# Patient Record
Sex: FEMALE | Race: WHITE | NOT HISPANIC OR LATINO | Employment: UNEMPLOYED | URBAN - METROPOLITAN AREA
[De-identification: names, ages, dates, MRNs, and addresses within clinical notes are randomized per-mention and may not be internally consistent; named-entity substitution may affect disease eponyms.]

---

## 2021-03-31 LAB — POC BETA-HCG (QUAL): NEGATIVE

## 2022-04-10 ENCOUNTER — HISTORICAL (OUTPATIENT)
Dept: ADMINISTRATIVE | Facility: HOSPITAL | Age: 27
End: 2022-04-10

## 2022-04-26 VITALS
HEIGHT: 63 IN | DIASTOLIC BLOOD PRESSURE: 87 MMHG | WEIGHT: 179.81 LBS | SYSTOLIC BLOOD PRESSURE: 129 MMHG | BODY MASS INDEX: 31.86 KG/M2

## 2022-09-21 ENCOUNTER — HISTORICAL (OUTPATIENT)
Dept: ADMINISTRATIVE | Facility: HOSPITAL | Age: 27
End: 2022-09-21

## 2023-01-26 LAB
CHLAMYDIA: POSITIVE
PAP SMEAR: NORMAL

## 2023-01-26 RX ORDER — BUPROPION HYDROCHLORIDE 300 MG/1
300 TABLET ORAL
COMMUNITY
Start: 2022-04-29

## 2023-01-26 RX ORDER — DEXTROAMPHETAMINE SACCHARATE, AMPHETAMINE ASPARTATE, DEXTROAMPHETAMINE SULFATE AND AMPHETAMINE SULFATE 5; 5; 5; 5 MG/1; MG/1; MG/1; MG/1
1 TABLET ORAL DAILY
COMMUNITY

## 2023-01-31 ENCOUNTER — OFFICE VISIT (OUTPATIENT)
Dept: OBSTETRICS AND GYNECOLOGY | Facility: CLINIC | Age: 28
End: 2023-01-31
Payer: COMMERCIAL

## 2023-01-31 VITALS
SYSTOLIC BLOOD PRESSURE: 116 MMHG | DIASTOLIC BLOOD PRESSURE: 70 MMHG | WEIGHT: 179.69 LBS | HEIGHT: 63 IN | OXYGEN SATURATION: 98 % | RESPIRATION RATE: 18 BRPM | HEART RATE: 84 BPM | BODY MASS INDEX: 31.84 KG/M2

## 2023-01-31 DIAGNOSIS — A74.9 CHLAMYDIA: Primary | ICD-10-CM

## 2023-01-31 PROCEDURE — 3078F PR MOST RECENT DIASTOLIC BLOOD PRESSURE < 80 MM HG: ICD-10-PCS | Mod: CPTII,,, | Performed by: NURSE PRACTITIONER

## 2023-01-31 PROCEDURE — 1159F MED LIST DOCD IN RCRD: CPT | Mod: CPTII,,, | Performed by: NURSE PRACTITIONER

## 2023-01-31 PROCEDURE — 1160F RVW MEDS BY RX/DR IN RCRD: CPT | Mod: CPTII,,, | Performed by: NURSE PRACTITIONER

## 2023-01-31 PROCEDURE — 3008F PR BODY MASS INDEX (BMI) DOCUMENTED: ICD-10-PCS | Mod: CPTII,,, | Performed by: NURSE PRACTITIONER

## 2023-01-31 PROCEDURE — 1160F PR REVIEW ALL MEDS BY PRESCRIBER/CLIN PHARMACIST DOCUMENTED: ICD-10-PCS | Mod: CPTII,,, | Performed by: NURSE PRACTITIONER

## 2023-01-31 PROCEDURE — 99213 OFFICE O/P EST LOW 20 MIN: CPT | Mod: ,,, | Performed by: NURSE PRACTITIONER

## 2023-01-31 PROCEDURE — 1159F PR MEDICATION LIST DOCUMENTED IN MEDICAL RECORD: ICD-10-PCS | Mod: CPTII,,, | Performed by: NURSE PRACTITIONER

## 2023-01-31 PROCEDURE — 99213 PR OFFICE/OUTPT VISIT, EST, LEVL III, 20-29 MIN: ICD-10-PCS | Mod: ,,, | Performed by: NURSE PRACTITIONER

## 2023-01-31 PROCEDURE — 3074F PR MOST RECENT SYSTOLIC BLOOD PRESSURE < 130 MM HG: ICD-10-PCS | Mod: CPTII,,, | Performed by: NURSE PRACTITIONER

## 2023-01-31 PROCEDURE — 3078F DIAST BP <80 MM HG: CPT | Mod: CPTII,,, | Performed by: NURSE PRACTITIONER

## 2023-01-31 PROCEDURE — 3074F SYST BP LT 130 MM HG: CPT | Mod: CPTII,,, | Performed by: NURSE PRACTITIONER

## 2023-01-31 PROCEDURE — 3008F BODY MASS INDEX DOCD: CPT | Mod: CPTII,,, | Performed by: NURSE PRACTITIONER

## 2023-01-31 NOTE — PROGRESS NOTES
"  Chief Complaint: STD Test of Cure     HPI:      Ashley Etienne is a 27 y.o.  who presents with previous diagnosis of Chlamydia for a test of cure.  She took her meds approximately 2 weeks ago.  She denies itching, denies odor, denies discharge.  She denies sores, blisters, or ulcers. Reports that partner did also take meds for the infection.  Patient's last menstrual period was 2023..    History reviewed. No pertinent past medical history.    ROS:     GENERAL: Denies weight gain or weight loss. Feeling well overall.   SKIN: Denies rash or lesions.   ABDOMEN: Denies abdominal pain, constipation, diarrhea, nausea, vomiting, change in appetite or rectal bleeding.   URINARY: Denies frequency, dysuria, hematuria.  GYN: See HPI.    Physical Exam:      PHYSICAL EXAM:   Vitals:    23 0749   BP: 116/70   Pulse: 84   Resp: 18   SpO2: 98%   Weight: 81.5 kg (179 lb 10.8 oz)   Height: 5' 3" (1.6 m)     Body mass index is 31.83 kg/m².    General: No acute distress, alert and engaged  Pelvic: External genitalia and urethra within normal limits.    Vagina without lesions, with discharge, without ulcers.   Cervix without cervical motion tenderness   Uterus normal in size and nontender. No adnexal masses or tenderness palpated.    Assessment/Plan:     Chlamydia  -     MDL Sendout Test      KB for STD, will notify patient of vaginal swab and blood work results as they are resulted  Follow up: PRN if no improvement.      Counseling:     Counseled on safe sex practices including barrier methods such as condoms to protect against STIs.     "

## 2023-02-03 ENCOUNTER — DOCUMENTATION ONLY (OUTPATIENT)
Dept: OBSTETRICS AND GYNECOLOGY | Facility: CLINIC | Age: 28
End: 2023-02-03
Payer: COMMERCIAL

## 2023-02-03 LAB — CHLAMYDIA: NEGATIVE

## 2023-03-01 ENCOUNTER — OFFICE VISIT (OUTPATIENT)
Dept: OBSTETRICS AND GYNECOLOGY | Facility: CLINIC | Age: 28
End: 2023-03-01
Payer: COMMERCIAL

## 2023-03-01 VITALS
DIASTOLIC BLOOD PRESSURE: 72 MMHG | HEIGHT: 63 IN | HEART RATE: 92 BPM | SYSTOLIC BLOOD PRESSURE: 110 MMHG | WEIGHT: 183 LBS | BODY MASS INDEX: 32.43 KG/M2

## 2023-03-01 DIAGNOSIS — N76.0 ACUTE VAGINITIS: ICD-10-CM

## 2023-03-01 DIAGNOSIS — N89.8 VAGINAL IRRITATION: Primary | ICD-10-CM

## 2023-03-01 PROCEDURE — 3008F BODY MASS INDEX DOCD: CPT | Mod: CPTII,,, | Performed by: NURSE PRACTITIONER

## 2023-03-01 PROCEDURE — 3008F PR BODY MASS INDEX (BMI) DOCUMENTED: ICD-10-PCS | Mod: CPTII,,, | Performed by: NURSE PRACTITIONER

## 2023-03-01 PROCEDURE — 99213 PR OFFICE/OUTPT VISIT, EST, LEVL III, 20-29 MIN: ICD-10-PCS | Mod: ,,, | Performed by: NURSE PRACTITIONER

## 2023-03-01 PROCEDURE — 3078F PR MOST RECENT DIASTOLIC BLOOD PRESSURE < 80 MM HG: ICD-10-PCS | Mod: CPTII,,, | Performed by: NURSE PRACTITIONER

## 2023-03-01 PROCEDURE — 1159F PR MEDICATION LIST DOCUMENTED IN MEDICAL RECORD: ICD-10-PCS | Mod: CPTII,,, | Performed by: NURSE PRACTITIONER

## 2023-03-01 PROCEDURE — 1159F MED LIST DOCD IN RCRD: CPT | Mod: CPTII,,, | Performed by: NURSE PRACTITIONER

## 2023-03-01 PROCEDURE — 1160F RVW MEDS BY RX/DR IN RCRD: CPT | Mod: CPTII,,, | Performed by: NURSE PRACTITIONER

## 2023-03-01 PROCEDURE — 1160F PR REVIEW ALL MEDS BY PRESCRIBER/CLIN PHARMACIST DOCUMENTED: ICD-10-PCS | Mod: CPTII,,, | Performed by: NURSE PRACTITIONER

## 2023-03-01 PROCEDURE — 3074F PR MOST RECENT SYSTOLIC BLOOD PRESSURE < 130 MM HG: ICD-10-PCS | Mod: CPTII,,, | Performed by: NURSE PRACTITIONER

## 2023-03-01 PROCEDURE — 99213 OFFICE O/P EST LOW 20 MIN: CPT | Mod: ,,, | Performed by: NURSE PRACTITIONER

## 2023-03-01 PROCEDURE — 3078F DIAST BP <80 MM HG: CPT | Mod: CPTII,,, | Performed by: NURSE PRACTITIONER

## 2023-03-01 PROCEDURE — 3074F SYST BP LT 130 MM HG: CPT | Mod: CPTII,,, | Performed by: NURSE PRACTITIONER

## 2023-03-01 RX ORDER — CLOTRIMAZOLE AND BETAMETHASONE DIPROPIONATE 10; .64 MG/G; MG/G
CREAM TOPICAL 2 TIMES DAILY
Qty: 45 G | Refills: 1 | Status: SHIPPED | OUTPATIENT
Start: 2023-03-01 | End: 2023-03-15

## 2023-03-01 RX ORDER — FLUCONAZOLE 150 MG/1
150 TABLET ORAL EVERY OTHER DAY
Qty: 2 TABLET | Refills: 0 | Status: SHIPPED | OUTPATIENT
Start: 2023-03-01 | End: 2023-03-04

## 2023-03-01 NOTE — PROGRESS NOTES
" Patient ID: [id number]     Chief Complaint:  VAG SWELLING,IRRITATION ,AND ITCHING.  HPI:     Ashley Etienne is a 27 y.o. female here today for PROBLEM (PRESENTS TO CLINIC FOR C/O VAG SWELLING,IRRITATION ,AND ITCHING. STATES WAS TX LAST WEEK FOR UTI AND BEGAN HAVING VAG IRRITATION SO USED OTC MONISTAT X 1 AND MADE IRRITATION WORSE WITH SWELLING IN VAG AREA. DENIES DYSURIA AT THIS TIME.)      Past Medical History:  has a past medical history of ADD (attention deficit disorder) and Anxiety disorder, unspecified.    Surgical History:  has a past surgical history that includes LEFT ARM and [place] tooth extraction.    Family History: family history is not on file.    Social History:  reports that she has never smoked. She has never used smokeless tobacco. She reports current alcohol use. She reports that she does not use drugs.    Current Outpatient Medications   Medication Sig Dispense Refill    buPROPion (WELLBUTRIN XL) 300 MG 24 hr tablet Take 300 mg by mouth.      dextroamphetamine-amphetamine (ADDERALL) 20 mg tablet Take 1 tablet by mouth once daily.      clotrimazole-betamethasone 1-0.05% (LOTRISONE) cream Apply topically 2 (two) times daily. for 14 days 45 g 1    fluconazole (DIFLUCAN) 150 MG Tab Take 1 tablet (150 mg total) by mouth every other day. for 2 doses 2 tablet 0     No current facility-administered medications for this visit.       Patient has No Known Allergies.         Subjective:     Review of Systems    12 point review of systems conducted, negative except as stated in the history of present illness. See HPI for details.      Objective:     Visit Vitals  /72   Pulse 92   Ht 5' 3" (1.6 m)   Wt 83 kg (183 lb)   LMP 02/10/2023 (Exact Date)   BMI 32.42 kg/m²       Physical Exam  Constitutional:  General Appearance : alert, in no acute distress, normal, well nourished.  Neck/Thyroid:  Inspection/Palpation: normal. Thyroid: normal size and shape.  Respiratory:  Auscultation: clear to auscultation " bilaterally. Respiratory Effort: normal.  Breast:  Right: Inspection/palpation: no discharge, no masses present, no nipple retraction, no skin changes, no skin dimpling, no tenderness, no lymphadenopathy, no axillary mass, no axillary tenderness.  Left: Inspection/palpation: no discharge, no masses present, no nipple retraction, no skin changes, no skin dimpling, no tenderness, no lymphadenopathy, no axillary mass, no axillary tenderness.  Gastrointestinal:  Abdomen: no masses. no tender, nondistended.  Liver and spleen: normal  Hernias: no hernias present, no inguinal adenopathy.  Genitourinary:  External Genitalia: normal, no lesions.  Vagina: red and edematous , no abnormal discharge, no lesions.  Bladder: no mass, nontender.  Urethra: no erythema or lesions present.  Cervix: no lesions, non tender.   Uterus: nontender, normal contour, normal mobility, normal size.   Adnexa: no masses, no tenderness.  Anus and Perineum: visually normal.   Chaperone Present  Assessment:       ICD-10-CM ICD-9-CM   1. Vaginal irritation  N89.8 623.9   2. Acute vaginitis  N76.0 616.10        Plan   Vaginal irritation  -     MDL Sendout Test    Acute vaginitis    Other orders  -     clotrimazole-betamethasone 1-0.05% (LOTRISONE) cream; Apply topically 2 (two) times daily. for 14 days  Dispense: 45 g; Refill: 1  -     fluconazole (DIFLUCAN) 150 MG Tab; Take 1 tablet (150 mg total) by mouth every other day. for 2 doses  Dispense: 2 tablet; Refill: 0        No follow-ups on file. In addition to their scheduled follow up, the patient has also been instructed to follow up on as needed basis.     DREW Julio

## 2023-03-06 ENCOUNTER — DOCUMENTATION ONLY (OUTPATIENT)
Dept: OBSTETRICS AND GYNECOLOGY | Facility: CLINIC | Age: 28
End: 2023-03-06
Payer: COMMERCIAL

## 2023-06-14 ENCOUNTER — OFFICE VISIT (OUTPATIENT)
Dept: OBSTETRICS AND GYNECOLOGY | Facility: CLINIC | Age: 28
End: 2023-06-14
Payer: COMMERCIAL

## 2023-06-14 ENCOUNTER — DOCUMENTATION ONLY (OUTPATIENT)
Dept: OBSTETRICS AND GYNECOLOGY | Facility: CLINIC | Age: 28
End: 2023-06-14
Payer: COMMERCIAL

## 2023-06-14 VITALS
HEIGHT: 62 IN | RESPIRATION RATE: 18 BRPM | WEIGHT: 187.06 LBS | SYSTOLIC BLOOD PRESSURE: 122 MMHG | BODY MASS INDEX: 34.42 KG/M2 | OXYGEN SATURATION: 96 % | DIASTOLIC BLOOD PRESSURE: 62 MMHG | HEART RATE: 81 BPM

## 2023-06-14 DIAGNOSIS — L65.9 HAIR LOSS: Primary | ICD-10-CM

## 2023-06-14 DIAGNOSIS — Z01.419 WOMEN'S ANNUAL ROUTINE GYNECOLOGICAL EXAMINATION: ICD-10-CM

## 2023-06-14 DIAGNOSIS — N63.20 MASS OF LEFT BREAST, UNSPECIFIED QUADRANT: ICD-10-CM

## 2023-06-14 LAB
ALBUMIN SERPL-MCNC: 4.3 G/DL (ref 3.4–5)
ALBUMIN/GLOB SERPL: 1.7 RATIO
ALP SERPL-CCNC: 50 UNIT/L (ref 50–144)
ALT SERPL-CCNC: 51 UNIT/L (ref 1–45)
ANION GAP SERPL CALC-SCNC: 5 MEQ/L (ref 2–13)
AST SERPL-CCNC: 31 UNIT/L (ref 14–36)
BILIRUBIN DIRECT+TOT PNL SERPL-MCNC: 0.4 MG/DL (ref 0–1)
BUN SERPL-MCNC: 12 MG/DL (ref 7–20)
CALCIUM SERPL-MCNC: 9.1 MG/DL (ref 8.4–10.2)
CHLORIDE SERPL-SCNC: 105 MMOL/L (ref 98–110)
CO2 SERPL-SCNC: 29 MMOL/L (ref 21–32)
CREAT SERPL-MCNC: 0.72 MG/DL (ref 0.66–1.25)
CREAT/UREA NIT SERPL: 17 (ref 12–20)
DEPRECATED CALCIDIOL+CALCIFEROL SERPL-MC: 43.4 NG/ML (ref 30–100)
ERYTHROCYTE [DISTWIDTH] IN BLOOD BY AUTOMATED COUNT: 12.1 % (ref 11–14.5)
FERRITIN SERPL-MCNC: 26.2 NG/ML (ref 6.24–264)
GFR SERPLBLD CREATININE-BSD FMLA CKD-EPI: >90 MLS/MIN/1.73/M2
GLOBULIN SER-MCNC: 2.6 GM/DL (ref 2–3.9)
GLUCOSE SERPL-MCNC: 94 MG/DL (ref 70–115)
HCT VFR BLD AUTO: 38.3 % (ref 36–48)
HGB BLD-MCNC: 13.2 G/DL (ref 11.8–16)
IRON SERPL-MCNC: 127 UG/DL (ref 37–170)
MCH RBC QN AUTO: 29.9 PG (ref 27–34)
MCHC RBC AUTO-ENTMCNC: 34.5 G/DL (ref 31–37)
MCV RBC AUTO: 86.8 FL (ref 79–99)
NRBC BLD AUTO-RTO: 0 %
PLATELET # BLD AUTO: 244 X10(3)/MCL (ref 140–371)
PMV BLD AUTO: 10.5 FL (ref 9.4–12.4)
POTASSIUM SERPL-SCNC: 4.6 MMOL/L (ref 3.5–5.1)
PROT SERPL-MCNC: 6.9 GM/DL (ref 6.3–8.2)
RBC # BLD AUTO: 4.41 X10(6)/MCL (ref 4–5.1)
SODIUM SERPL-SCNC: 139 MMOL/L (ref 135–145)
T4 FREE SERPL-MCNC: 0.84 NG/DL (ref 0.78–2.19)
TSH SERPL-ACNC: 1.89 UIU/ML (ref 0.36–3.74)
WBC # SPEC AUTO: 5.87 X10(3)/MCL (ref 4–11.5)

## 2023-06-14 PROCEDURE — 80053 COMPREHEN METABOLIC PANEL: CPT | Performed by: NURSE PRACTITIONER

## 2023-06-14 PROCEDURE — 84146 ASSAY OF PROLACTIN: CPT | Performed by: NURSE PRACTITIONER

## 2023-06-14 PROCEDURE — 84402 ASSAY OF FREE TESTOSTERONE: CPT | Performed by: NURSE PRACTITIONER

## 2023-06-14 PROCEDURE — 3008F BODY MASS INDEX DOCD: CPT | Mod: CPTII,,, | Performed by: NURSE PRACTITIONER

## 2023-06-14 PROCEDURE — 1160F PR REVIEW ALL MEDS BY PRESCRIBER/CLIN PHARMACIST DOCUMENTED: ICD-10-PCS | Mod: CPTII,,, | Performed by: NURSE PRACTITIONER

## 2023-06-14 PROCEDURE — 83540 ASSAY OF IRON: CPT | Performed by: NURSE PRACTITIONER

## 2023-06-14 PROCEDURE — 82627 DEHYDROEPIANDROSTERONE: CPT | Performed by: NURSE PRACTITIONER

## 2023-06-14 PROCEDURE — 3078F DIAST BP <80 MM HG: CPT | Mod: CPTII,,, | Performed by: NURSE PRACTITIONER

## 2023-06-14 PROCEDURE — 85027 COMPLETE CBC AUTOMATED: CPT | Performed by: NURSE PRACTITIONER

## 2023-06-14 PROCEDURE — 82626 DEHYDROEPIANDROSTERONE: CPT | Performed by: NURSE PRACTITIONER

## 2023-06-14 PROCEDURE — 84443 ASSAY THYROID STIM HORMONE: CPT | Performed by: NURSE PRACTITIONER

## 2023-06-14 PROCEDURE — 3074F PR MOST RECENT SYSTOLIC BLOOD PRESSURE < 130 MM HG: ICD-10-PCS | Mod: CPTII,,, | Performed by: NURSE PRACTITIONER

## 2023-06-14 PROCEDURE — 3074F SYST BP LT 130 MM HG: CPT | Mod: CPTII,,, | Performed by: NURSE PRACTITIONER

## 2023-06-14 PROCEDURE — 3078F PR MOST RECENT DIASTOLIC BLOOD PRESSURE < 80 MM HG: ICD-10-PCS | Mod: CPTII,,, | Performed by: NURSE PRACTITIONER

## 2023-06-14 PROCEDURE — 84439 ASSAY OF FREE THYROXINE: CPT | Performed by: NURSE PRACTITIONER

## 2023-06-14 PROCEDURE — 83002 ASSAY OF GONADOTROPIN (LH): CPT | Performed by: NURSE PRACTITIONER

## 2023-06-14 PROCEDURE — 1159F MED LIST DOCD IN RCRD: CPT | Mod: CPTII,,, | Performed by: NURSE PRACTITIONER

## 2023-06-14 PROCEDURE — 1159F PR MEDICATION LIST DOCUMENTED IN MEDICAL RECORD: ICD-10-PCS | Mod: CPTII,,, | Performed by: NURSE PRACTITIONER

## 2023-06-14 PROCEDURE — 3008F PR BODY MASS INDEX (BMI) DOCUMENTED: ICD-10-PCS | Mod: CPTII,,, | Performed by: NURSE PRACTITIONER

## 2023-06-14 PROCEDURE — 86592 SYPHILIS TEST NON-TREP QUAL: CPT | Performed by: NURSE PRACTITIONER

## 2023-06-14 PROCEDURE — 99395 PR PREVENTIVE VISIT,EST,18-39: ICD-10-PCS | Mod: ,,, | Performed by: NURSE PRACTITIONER

## 2023-06-14 PROCEDURE — 82306 VITAMIN D 25 HYDROXY: CPT | Performed by: NURSE PRACTITIONER

## 2023-06-14 PROCEDURE — 1160F RVW MEDS BY RX/DR IN RCRD: CPT | Mod: CPTII,,, | Performed by: NURSE PRACTITIONER

## 2023-06-14 PROCEDURE — 82728 ASSAY OF FERRITIN: CPT | Performed by: NURSE PRACTITIONER

## 2023-06-14 PROCEDURE — 99395 PREV VISIT EST AGE 18-39: CPT | Mod: ,,, | Performed by: NURSE PRACTITIONER

## 2023-06-14 RX ORDER — DULOXETIN HYDROCHLORIDE 30 MG/1
30 CAPSULE, DELAYED RELEASE ORAL
COMMUNITY
Start: 2023-05-15 | End: 2023-07-31

## 2023-06-14 NOTE — PROGRESS NOTES
Patient ID: [id number]   Chief Complaint: Annual exam  Chief Complaint   Patient presents with    Well Woman     Wants hormone testing c/o hair loss. C/o lump in left breast, denies any pain     HPI:   Ashley Etienne is a 27 y.o. year old  here for her Annual Exam. Patient's last menstrual period was 2023 (exact date). She is doing well. Denies any health changes. Well Woman (Wants hormone testing c/o hair loss. C/o lump in left breast, denies any pain)    Subjective:     Past Medical History:   Diagnosis Date    ADD (attention deficit disorder)     Anxiety disorder, unspecified      Past Surgical History:   Procedure Laterality Date    LEFT ARM      STAPH    WISDOM TOOTH EXTRACTION       Social History     Tobacco Use    Smoking status: Never     Passive exposure: Never    Smokeless tobacco: Never   Substance Use Topics    Alcohol use: Yes     Comment: SOCIALLY    Drug use: Never     History reviewed. No pertinent family history.  OB History    Para Term  AB Living   1 1 1     1   SAB IAB Ectopic Multiple Live Births           1      # Outcome Date GA Lbr Jose Alberto/2nd Weight Sex Delivery Anes PTL Lv   1 Term 17 39w0d  3.188 kg (7 lb 0.5 oz) F Vag-Spont EPI  [place]       Current Outpatient Medications:     buPROPion (WELLBUTRIN XL) 300 MG 24 hr tablet, Take 300 mg by mouth., Disp: , Rfl:     dextroamphetamine-amphetamine (ADDERALL) 20 mg tablet, Take 1 tablet by mouth once daily., Disp: , Rfl:     DULoxetine (CYMBALTA) 30 MG capsule, Take 30 mg by mouth., Disp: , Rfl:   MENARCHEAL:  Cycle Length: 5 days   Flow: normal  Dysmenorrhea: No  If yes: Mild  Intermenstrual Bleeding: No  PAP:  Last PAP:2022  History of Abnormal PAP Smear: NO  Treated: n/a  HPV Vaccine: YES: approx at age 13     INTERCOURSE:  Dyspareunia: No  Postcoital Bleeding: No  History of STI: Yes   If yes, then: CZ  Current Birth Control Method: none  Sexually Active: yes  Review of Systems 12 point review of systems  "conducted, negative except as stated in the history of present illness. See HPI for details.  Objective:   Visit Vitals  /62 (BP Location: Left arm)   Pulse 81   Resp 18   Ht 5' 2" (1.575 m)   Wt 84.8 kg (187 lb 1 oz)   LMP 06/03/2023 (Exact Date)   SpO2 96%   BMI 34.21 kg/m²     Physical Exam:  Physical Exam  Constitutional:  General Appearance : alert, in no acute distress, normal, well nourished.  Respiratory:  Respiratory Effort: normal.  Breast:  Right: Inspection/palpation: no discharge, no masses present, no nipple retraction, no skin changes, no skin dimpling, no tenderness, no lymphadenopathy, no axillary mass, no axillary tenderness.  Left: Inspection/palpation: no discharge, no masses present, no nipple retraction, no skin changes, no skin dimpling, no tenderness, no lymphadenopathy, no axillary mass, no axillary tenderness. (Pt reports she notices lump in shower)  Gastrointestinal:  Abdomen: no masses. no tender, nondistended.  Liver and spleen: normal  Hernias: no hernias present, no inguinal adenopathy.  Genitourinary:  External Genitalia: normal, no lesions.  Vagina: normal appearance, no abnormal discharge, no lesions.  Bladder: no mass, nontender.  Urethra: no erythema or lesions present.  Cervix: no lesions, non tender. Pap Done  Uterus: nontender, normal contour, normal mobility, normal size.   Adnexa: no masses, no tenderness.  Anus and Perineum: visually normal.   Chaperone Present  No results found for this or any previous visit (from the past 24 hour(s)).  Assessment/Plan:   Assessment:   Hair loss  -     CBC Without Differential; Future; Expected date: 06/14/2023  -     Comprehensive Metabolic Panel; Future; Expected date: 06/14/2023  -     DHEA-Sulfate; Future; Expected date: 06/14/2023  -     Ferritin; Future; Expected date: 06/14/2023  -     Iron; Future; Expected date: 06/14/2023  -     Luteinizing Hormone; Future; Expected date: 06/14/2023  -     Prolactin; Future; Expected date: " 06/14/2023  -     RPR; Future; Expected date: 06/14/2023  -     T4, Free; Future; Expected date: 06/14/2023  -     TESTOSTERONE, FREE (DIALYSIS) AND TOTAL, LC/MS/MS; Future; Expected date: 06/14/2023  -     TSH; Future; Expected date: 06/14/2023  -     Vitamin D; Future; Expected date: 06/14/2023  -     Dehydroepiandrosterone, Serum; Future; Expected date: 06/14/2023    Women's annual routine gynecological examination  -     Liquid-Based Pap Smear, Screening Screening    Mass of left breast, unspecified quadrant  -     US Breast Left Complete      Follow up in about 1 year (around 6/14/2024) for ANNUAL. In addition to their scheduled FU, the patient has also been instructed to follow up on as needed basis. All questions were answered and the patient voiced understanding of the above issues.

## 2023-06-15 LAB
DHEA-S SERPL-MCNC: 216 MCG/DL (ref 83–377)
LH SERPL-ACNC: 8.18 MIU/ML
PROLACTIN LEVEL (OHS): 8.18 NG/ML (ref 5.18–26.53)
RPR SER QL: NORMAL
RPR SER-TITR: NORMAL {TITER}

## 2023-06-18 LAB
TESTOST FREE SERPL-MCNC: 0.64 NG/DL
TESTOST SERPL-MCNC: 20 NG/DL (ref 8–60)

## 2023-06-19 LAB — DHEA SERPL-MCNC: 3.1 NG/ML

## 2023-06-21 ENCOUNTER — DOCUMENTATION ONLY (OUTPATIENT)
Dept: OBSTETRICS AND GYNECOLOGY | Facility: CLINIC | Age: 28
End: 2023-06-21
Payer: COMMERCIAL

## 2023-06-21 LAB — PSYCHE PATHOLOGY RESULT: NORMAL

## 2023-06-22 ENCOUNTER — DOCUMENTATION ONLY (OUTPATIENT)
Dept: OBSTETRICS AND GYNECOLOGY | Facility: CLINIC | Age: 28
End: 2023-06-22
Payer: COMMERCIAL

## 2023-06-26 ENCOUNTER — TELEPHONE (OUTPATIENT)
Dept: OBSTETRICS AND GYNECOLOGY | Facility: CLINIC | Age: 28
End: 2023-06-26
Payer: COMMERCIAL

## 2023-06-26 NOTE — TELEPHONE ENCOUNTER
Called pt.  confirmed. Informed of pap results with need for colpo. Appoint scheduled for 23 for results and colpo with Dr. Fajardo.

## 2023-06-26 NOTE — TELEPHONE ENCOUNTER
----- Message from DREW Julio sent at 6/24/2023  3:09 PM CDT -----  Pt needs appt to [facility] for Colpo.

## 2023-07-13 ENCOUNTER — PROCEDURE VISIT (OUTPATIENT)
Dept: OBSTETRICS AND GYNECOLOGY | Facility: CLINIC | Age: 28
End: 2023-07-13
Payer: COMMERCIAL

## 2023-07-13 VITALS
BODY MASS INDEX: 35.46 KG/M2 | HEART RATE: 73 BPM | RESPIRATION RATE: 18 BRPM | SYSTOLIC BLOOD PRESSURE: 122 MMHG | HEIGHT: 62 IN | WEIGHT: 192.69 LBS | DIASTOLIC BLOOD PRESSURE: 78 MMHG | OXYGEN SATURATION: 98 %

## 2023-07-13 DIAGNOSIS — L65.9 HAIR LOSS: ICD-10-CM

## 2023-07-13 DIAGNOSIS — R87.612 LGSIL ON PAP SMEAR OF CERVIX: Primary | ICD-10-CM

## 2023-07-13 DIAGNOSIS — R79.89 ELEVATED LIVER FUNCTION TESTS: ICD-10-CM

## 2023-07-13 LAB
B-HCG UR QL: NEGATIVE
CTP QC/QA: YES

## 2023-07-13 PROCEDURE — 57455 BIOPSY OF CERVIX W/SCOPE: CPT | Mod: ,,, | Performed by: OBSTETRICS & GYNECOLOGY

## 2023-07-13 PROCEDURE — 81025 POCT URINE PREGNANCY: ICD-10-PCS | Mod: ,,, | Performed by: OBSTETRICS & GYNECOLOGY

## 2023-07-13 PROCEDURE — 81025 URINE PREGNANCY TEST: CPT | Mod: ,,, | Performed by: OBSTETRICS & GYNECOLOGY

## 2023-07-13 PROCEDURE — 99213 OFFICE O/P EST LOW 20 MIN: CPT | Mod: 25,,, | Performed by: OBSTETRICS & GYNECOLOGY

## 2023-07-13 PROCEDURE — 99213 PR OFFICE/OUTPT VISIT, EST, LEVL III, 20-29 MIN: ICD-10-PCS | Mod: 25,,, | Performed by: OBSTETRICS & GYNECOLOGY

## 2023-07-13 PROCEDURE — 57455 PR COLPOSCOPY,CERVIX W/ADJ VAGINA,W/BX: ICD-10-PCS | Mod: ,,, | Performed by: OBSTETRICS & GYNECOLOGY

## 2023-07-13 NOTE — PROGRESS NOTES
"   Patient ID: [id number]   Chief Complaint: Results (Presents to clinic for results and colpo. Consents signed. )    HPI:     Ashley Etienne is a 27 y.o.  here today for Results   Presents to clinic for results and colpo. Consents signed.   PAP WAS LGSIL WITH NEGATIVE HPV  ALL LABS FOR ALOPECIA WERE NORMAL EXCEPT HAD MILDLY ELEVATED ALT.  DISCUSSED THIS FINDING THE PATIENT AND NEED TO DO LIVER FUNCTION TEST.     Past Medical History:  has a past medical history of Abnormal Pap smear of cervix, ADD (attention deficit disorder), and Anxiety disorder, unspecified.    Surgical History:  has a past surgical history that includes LEFT ARM and [place] tooth extraction.    Family History: family history is not on file.    Social History:  reports that she has never smoked. She has never been exposed to tobacco smoke. She has never used smokeless tobacco. She reports current alcohol use. She reports that she does not use drugs.    Current Outpatient Medications   Medication Sig Dispense Refill    buPROPion (WELLBUTRIN XL) 300 MG 24 hr tablet Take 300 mg by mouth.      dextroamphetamine-amphetamine (ADDERALL) 20 mg tablet Take 1 tablet by mouth once daily.      DULoxetine (CYMBALTA) 30 MG capsule Take 30 mg by mouth.       No current facility-administered medications for this visit.       Patient has No Known Allergies.     Recent Results (from the past 24 hour(s))   POCT Urine Pregnancy    Collection Time: 23  3:58 PM   Result Value Ref Range    POC Preg Test, Ur Negative (A) Negative     Acceptable Yes        Subjective:     Review of Systems    12 point review of systems conducted, negative except as stated in the history of present illness. See HPI for details.      Objective:     Visit Vitals  /78 (BP Location: Left arm)   Pulse 73   Resp 18   Ht 5' 2" (1.575 m)   Wt 87.4 kg (192 lb 10.9 oz)   LMP 2023 (Exact Date)   SpO2 98%   BMI 35.24 kg/m²       Physical " Exam  Constitutional:  General Appearance : alert, in no acute distress, normal, well nourished.  Neck/Thyroid:  Inspection/Palpation: normal. Thyroid: normal size and shape.  Respiratory:  Respiratory Effort: normal.  Gastrointestinal:  Abdomen: no masses. no tender, nondistended.  Liver and spleen: normal  Hernias: no hernias present, no inguinal adenopathy.  Genitourinary:  External Genitalia: normal, no lesions.  Vagina: normal appearance, no abnormal discharge, no lesions.  Bladder: no mass, nontender.  Urethra: no erythema or lesions present.  Cervix: no lesions, non tender. (SEE COLPO RPOCEDURE NOTE)  Uterus: nontender, normal contour, normal mobility, normal size.   Adnexa: no masses, no tenderness.  Anus and Perineum: visually normal.   Chaperone Present  Assessment:       ICD-10-CM ICD-9-CM   1. LGSIL on Pap smear of cervix  R87.612 795.03   2. Hair loss  L65.9 704.00   3. Elevated liver function tests  R79.89 790.6     Plan   LGSIL on Pap smear of cervix  -     POCT Urine Pregnancy  -     Cancel: Specimen to Pathology Gynecology and Obstetrics  -     Specimen to Pathology Gynecology and Obstetrics  -     Colposcopy    Hair loss  LABS WERE NL    Elevated liver function tests  -     Hepatic Function Panel; Future; Expected date: 07/13/2023    Follow up in about 2 weeks (around 7/27/2023) for RESULTS. In addition to their scheduled follow up, the patient has also been instructed to follow up on as needed basis.     ERIS LEWIS MD

## 2023-07-13 NOTE — PROCEDURES
Colposcopy    Date/Time: 7/13/2023 3:15 PM  Performed by: Teofilo Fajardo MD  Authorized by: Teofilo Fajardo MD     Consent Done?:  Yes (Written)  Timeout:Immediately prior to procedure a time out was called to verify the correct patient, procedure, equipment, support staff and site/side marked as required  Prep:Patient was prepped and draped in the usual sterile fashion  Assistants?: No      Colposcopy Site:  Cervix  Position:  Supine  Acrowhite Lesion? Yes    Atypical Vessels: No    Transformation Zone Adequate?: Yes    Biopsy?: Yes         Location:  Cervix ((5 00))  ECC Performed?: No    LEEP Performed?: No     Patient tolerated the procedure well with no immediate complications.   Post-operative instructions were provided for the patient.   Patient was discharged and will follow up if any complications occur

## 2023-07-13 NOTE — PROGRESS NOTES
"  Patient ID: [id number]   Chief Complaint: Results and Colposcopy (Presents to clinic for lab results and colposcopy. Consents signed. )    HPI:     Ashley Etienne is a 27 y.o.  here today for Results and Colposcopy (Presents to clinic for lab results and colposcopy. Consents signed. )      Past Medical History:  has a past medical history of Abnormal Pap smear of cervix, ADD (attention deficit disorder), and Anxiety disorder, unspecified.    Surgical History:  has a past surgical history that includes LEFT ARM and [place] tooth extraction.    Family History: family history is not on file.    Social History:  reports that she has never smoked. She has never been exposed to tobacco smoke. She has never used smokeless tobacco. She reports current alcohol use. She reports that she does not use drugs.    Current Outpatient Medications   Medication Sig Dispense Refill    buPROPion (WELLBUTRIN XL) 300 MG 24 hr tablet Take 300 mg by mouth.      dextroamphetamine-amphetamine (ADDERALL) 20 mg tablet Take 1 tablet by mouth once daily.      DULoxetine (CYMBALTA) 30 MG capsule Take 30 mg by mouth.       No current facility-administered medications for this visit.       Patient has No Known Allergies.     MENARCHEAL:  Cycle Length: 4 days   Flow: normal  Dysmenorrhea: No  If yes: Mild  Intermenstrual Bleeding: No  PAP:  Last PAP: 2023    History of Abnormal PAP Smear: YES: LGSIL      Treated: Colposcopy  HPV Vaccine: YES:      INTERCOURSE:  Dyspareunia: No  Postcoital Bleeding: No  History of STI: Yes   If yes, then: CZ  Current Birth Control Method: none  Sexually Active: yes          No results found for this or any previous visit (from the past 24 hour(s)).    Subjective:     Review of Systems    12 point review of systems conducted, negative except as stated in the history of present illness. See HPI for details.      Objective:     Visit Vitals  /78 (BP Location: Left arm)   Pulse 73   Resp 18   Ht 5' 2" " (1.575 m)   Wt 87.4 kg (192 lb 10.9 oz)   LMP 06/30/2023 (Exact Date)   SpO2 98%   BMI 35.24 kg/m²       Physical Exam  Constitutional:  General Appearance : alert, in no acute distress, normal, well nourished.  Neck/Thyroid:  Inspection/Palpation: normal. Thyroid: normal size and shape.  Respiratory:  Respiratory Effort: normal.  Breast:  Right: Inspection/palpation: no discharge, no masses present, no nipple retraction, no skin changes, no skin dimpling, no tenderness, no lymphadenopathy, no axillary mass, no axillary tenderness.  Left: Inspection/palpation: no discharge, no masses present, no nipple retraction, no skin changes, no skin dimpling, no tenderness, no lymphadenopathy, no axillary mass, no axillary tenderness.  Gastrointestinal:  Abdomen: no masses. no tender, nondistended.  Liver and spleen: normal  Hernias: no hernias present, no inguinal adenopathy.  Genitourinary:  External Genitalia: normal, no lesions.  Vagina: normal appearance, no abnormal discharge, no lesions.  Bladder: no mass, nontender.  Urethra: no erythema or lesions present.  Cervix: no lesions, non tender.   Uterus: nontender, normal contour, normal mobility, normal size.   Adnexa: no masses, no tenderness.  Anus and Perineum: visually normal.   Chaperone Present  Assessment:       ICD-10-CM ICD-9-CM   1. LGSIL on Pap smear of cervix  R87.612 795.03     Plan   LGSIL on Pap smear of cervix  -     POCT Urine Pregnancy        No follow-ups on file. In addition to their scheduled follow up, the patient has also been instructed to follow up on as needed basis.     Vicky Barnes LPN

## 2023-07-14 ENCOUNTER — TELEPHONE (OUTPATIENT)
Dept: OBSTETRICS AND GYNECOLOGY | Facility: CLINIC | Age: 28
End: 2023-07-14
Payer: COMMERCIAL

## 2023-07-14 NOTE — TELEPHONE ENCOUNTER
----- Message from Teofilo Fajardo MD sent at 7/14/2023 10:48 AM CDT -----  PLEASE LET PT KNOW HER LIVER PROFILE WAS NL.

## 2023-07-17 LAB — PSYCHE PATHOLOGY RESULT: NORMAL

## 2023-07-27 ENCOUNTER — OFFICE VISIT (OUTPATIENT)
Dept: OBSTETRICS AND GYNECOLOGY | Facility: CLINIC | Age: 28
End: 2023-07-27
Payer: COMMERCIAL

## 2023-07-27 VITALS
HEIGHT: 63 IN | WEIGHT: 192 LBS | RESPIRATION RATE: 18 BRPM | DIASTOLIC BLOOD PRESSURE: 60 MMHG | BODY MASS INDEX: 34.02 KG/M2 | SYSTOLIC BLOOD PRESSURE: 114 MMHG | OXYGEN SATURATION: 99 % | HEART RATE: 70 BPM

## 2023-07-27 DIAGNOSIS — N87.0 DYSPLASIA OF CERVIX, LOW GRADE (CIN 1): Primary | ICD-10-CM

## 2023-07-27 PROCEDURE — 99213 OFFICE O/P EST LOW 20 MIN: CPT | Mod: ,,, | Performed by: OBSTETRICS & GYNECOLOGY

## 2023-07-27 PROCEDURE — 1160F RVW MEDS BY RX/DR IN RCRD: CPT | Mod: CPTII,,, | Performed by: OBSTETRICS & GYNECOLOGY

## 2023-07-27 PROCEDURE — 3008F BODY MASS INDEX DOCD: CPT | Mod: CPTII,,, | Performed by: OBSTETRICS & GYNECOLOGY

## 2023-07-27 PROCEDURE — 3008F PR BODY MASS INDEX (BMI) DOCUMENTED: ICD-10-PCS | Mod: CPTII,,, | Performed by: OBSTETRICS & GYNECOLOGY

## 2023-07-27 PROCEDURE — 1160F PR REVIEW ALL MEDS BY PRESCRIBER/CLIN PHARMACIST DOCUMENTED: ICD-10-PCS | Mod: CPTII,,, | Performed by: OBSTETRICS & GYNECOLOGY

## 2023-07-27 PROCEDURE — 3074F PR MOST RECENT SYSTOLIC BLOOD PRESSURE < 130 MM HG: ICD-10-PCS | Mod: CPTII,,, | Performed by: OBSTETRICS & GYNECOLOGY

## 2023-07-27 PROCEDURE — 3078F DIAST BP <80 MM HG: CPT | Mod: CPTII,,, | Performed by: OBSTETRICS & GYNECOLOGY

## 2023-07-27 PROCEDURE — 1159F PR MEDICATION LIST DOCUMENTED IN MEDICAL RECORD: ICD-10-PCS | Mod: CPTII,,, | Performed by: OBSTETRICS & GYNECOLOGY

## 2023-07-27 PROCEDURE — 1159F MED LIST DOCD IN RCRD: CPT | Mod: CPTII,,, | Performed by: OBSTETRICS & GYNECOLOGY

## 2023-07-27 PROCEDURE — 3078F PR MOST RECENT DIASTOLIC BLOOD PRESSURE < 80 MM HG: ICD-10-PCS | Mod: CPTII,,, | Performed by: OBSTETRICS & GYNECOLOGY

## 2023-07-27 PROCEDURE — 3074F SYST BP LT 130 MM HG: CPT | Mod: CPTII,,, | Performed by: OBSTETRICS & GYNECOLOGY

## 2023-07-27 PROCEDURE — 99213 PR OFFICE/OUTPT VISIT, EST, LEVL III, 20-29 MIN: ICD-10-PCS | Mod: ,,, | Performed by: OBSTETRICS & GYNECOLOGY

## 2023-07-27 RX ORDER — DESVENLAFAXINE SUCCINATE 50 MG/1
TABLET, EXTENDED RELEASE ORAL
COMMUNITY
Start: 2023-07-24

## 2023-07-27 NOTE — PROGRESS NOTES
Patient ID: [id number]   Chief Complaint: Results   HPI:     Ashley Etienne is a 27 y.o.  here today for Results. Presents to clinic for colpo results. Cervical biopsy was consistent with JASMIN 1 which was consistent her PAP. Liver profile was nl. Patient states ex partner has herpes. She is on her cycle today. She would like to have STD testing.    Past Medical History:  has a past medical history of Abnormal Pap smear of cervix, ADD (attention deficit disorder), and Anxiety disorder, unspecified.    Surgical History:  has a past surgical history that includes LEFT ARM and [place] tooth extraction.    Family History: family history is not on file.    Social History:  reports that she has never smoked. She has never been exposed to tobacco smoke. She has never used smokeless tobacco. She reports current alcohol use. She reports that she does not use drugs.    Current Outpatient Medications   Medication Sig Dispense Refill    buPROPion (WELLBUTRIN XL) 300 MG 24 hr tablet Take 300 mg by mouth.      desvenlafaxine succinate (PRISTIQ) 50 MG Tb24       dextroamphetamine-amphetamine (ADDERALL) 20 mg tablet Take 1 tablet by mouth once daily.      DULoxetine (CYMBALTA) 30 MG capsule Take 30 mg by mouth.       No current facility-administered medications for this visit.       Patient has No Known Allergies.     MENARCHEAL:  Cycle Length: 5 days   Flow: normal  Dysmenorrhea: Yes  If yes: Mild  Intermenstrual Bleeding: No  PAP:  Last PAP: 2023    History of Abnormal PAP Smear: YES: LGSIL     Treated: Colposcopy  HPV Vaccine: YES:      INTERCOURSE:  Dyspareunia: No  Postcoital Bleeding: No  History of STI: Yes   If yes, then: CZ  Current Birth Control Method: none  Sexually Active: yes     No results found for this or any previous visit (from the past 24 hour(s)).    Subjective:     Review of Systems    12 point review of systems conducted, negative except as stated in the history of present illness. See HPI for  "details.      Objective:     Visit Vitals  /60 (BP Location: Right arm)   Pulse 70   Resp 18   Ht 5' 3" (1.6 m)   Wt 87.1 kg (192 lb)   LMP 07/25/2023 (Exact Date)   SpO2 99%   BMI 34.01 kg/m²       Physical Exam  Constitutional:  General Appearance : alert, in no acute distress, normal, well nourished.  Neck/Thyroid:  Inspection/Palpation: normal. Thyroid: normal size and shape.  Respiratory:  Respiratory Effort: normal.  Gastrointestinal:  Abdomen: no masses. no tender, nondistended.  Liver and spleen: normal  Hernias: no hernias present, no inguinal adenopathy.    Assessment:       ICD-10-CM ICD-9-CM   1. Dysplasia of cervix, low grade (JASMIN 1)  N87.0 622.11     Plan   Dysplasia of cervix, low grade (JASMIN 1)    Pt will need pap in one year.     Follow up in about 1 week (around 8/3/2023) for std testing. In addition to their scheduled follow up, the patient has also been instructed to follow up on as needed basis.     ERIS LEWIS MD    "

## 2023-08-01 ENCOUNTER — OFFICE VISIT (OUTPATIENT)
Dept: OBSTETRICS AND GYNECOLOGY | Facility: CLINIC | Age: 28
End: 2023-08-01
Payer: COMMERCIAL

## 2023-08-01 VITALS
DIASTOLIC BLOOD PRESSURE: 82 MMHG | HEART RATE: 82 BPM | BODY MASS INDEX: 35.77 KG/M2 | WEIGHT: 194.38 LBS | SYSTOLIC BLOOD PRESSURE: 118 MMHG | HEIGHT: 62 IN

## 2023-08-01 DIAGNOSIS — Z20.2 POSSIBLE EXPOSURE TO STD: Primary | ICD-10-CM

## 2023-08-01 PROCEDURE — 1159F MED LIST DOCD IN RCRD: CPT | Mod: CPTII,,, | Performed by: NURSE PRACTITIONER

## 2023-08-01 PROCEDURE — 3008F BODY MASS INDEX DOCD: CPT | Mod: CPTII,,, | Performed by: NURSE PRACTITIONER

## 2023-08-01 PROCEDURE — 1159F PR MEDICATION LIST DOCUMENTED IN MEDICAL RECORD: ICD-10-PCS | Mod: CPTII,,, | Performed by: NURSE PRACTITIONER

## 2023-08-01 PROCEDURE — 99213 PR OFFICE/OUTPT VISIT, EST, LEVL III, 20-29 MIN: ICD-10-PCS | Mod: ,,, | Performed by: NURSE PRACTITIONER

## 2023-08-01 PROCEDURE — 99213 OFFICE O/P EST LOW 20 MIN: CPT | Mod: ,,, | Performed by: NURSE PRACTITIONER

## 2023-08-01 PROCEDURE — 3074F PR MOST RECENT SYSTOLIC BLOOD PRESSURE < 130 MM HG: ICD-10-PCS | Mod: CPTII,,, | Performed by: NURSE PRACTITIONER

## 2023-08-01 PROCEDURE — 3008F PR BODY MASS INDEX (BMI) DOCUMENTED: ICD-10-PCS | Mod: CPTII,,, | Performed by: NURSE PRACTITIONER

## 2023-08-01 PROCEDURE — 1160F RVW MEDS BY RX/DR IN RCRD: CPT | Mod: CPTII,,, | Performed by: NURSE PRACTITIONER

## 2023-08-01 PROCEDURE — 3079F PR MOST RECENT DIASTOLIC BLOOD PRESSURE 80-89 MM HG: ICD-10-PCS | Mod: CPTII,,, | Performed by: NURSE PRACTITIONER

## 2023-08-01 PROCEDURE — 3079F DIAST BP 80-89 MM HG: CPT | Mod: CPTII,,, | Performed by: NURSE PRACTITIONER

## 2023-08-01 PROCEDURE — 3074F SYST BP LT 130 MM HG: CPT | Mod: CPTII,,, | Performed by: NURSE PRACTITIONER

## 2023-08-01 PROCEDURE — 1160F PR REVIEW ALL MEDS BY PRESCRIBER/CLIN PHARMACIST DOCUMENTED: ICD-10-PCS | Mod: CPTII,,, | Performed by: NURSE PRACTITIONER

## 2023-08-01 NOTE — PROGRESS NOTES
Patient ID: [id number]   Chief Complaint: problem (REQUESTING STD TESTING. STATES WAS TOLD PARTNER HAD HERPES.DENIES ANY LESIONS OR VAG DISCHARGE.)  HPI:     Ashley Etienne is a 27 y.o.  here today for problem (REQUESTING STD TESTING. STATES WAS TOLD PARTNER HAD HERPES.DENIES ANY LESIONS OR VAG DISCHARGE.)   Patient's last menstrual period was 2023 (exact date).    Past Medical History:  has a past medical history of Abnormal Pap smear of cervix, ADD (attention deficit disorder), Anxiety disorder, unspecified, and Chlamydia contact, treated.    Surgical History:  has a past surgical history that includes LEFT ARM and [place] tooth extraction.    Family History: family history is not on file.    Social History:  reports that she has never smoked. She has never been exposed to tobacco smoke. She has never used smokeless tobacco. She reports current alcohol use. She reports that she does not use drugs.    Current Outpatient Medications   Medication Sig Dispense Refill    buPROPion (WELLBUTRIN XL) 300 MG 24 hr tablet Take 300 mg by mouth.      desvenlafaxine succinate (PRISTIQ) 50 MG Tb24       dextroamphetamine-amphetamine (ADDERALL) 20 mg tablet Take 1 tablet by mouth once daily.       No current facility-administered medications for this visit.     Patient has No Known Allergies.     MENARCHEAL:  Cycle Length: 5 days   Flow: normal  Dysmenorrhea: Yes  If yes: Mild  Intermenstrual Bleeding: No  PAP:  Last PAP: 2023    History of Abnormal PAP Smear: YES: LGSIL HPV NEG  Treated: Colposcopy/JASMIN 1  HPV Vaccine: YES: AS ADOLESCENT    INTERCOURSE:  Dyspareunia: No  Postcoital Bleeding: No  History of STI: Yes   If yes, then: CZ  Current Birth Control Method: none  Sexually Active: yes  BREAST HISTORY:   Last Mammogram: U/S LEFT BREAST WNL  COLONOSCOPY:  Last Colonoscopy:  N/A     No results found for this or any previous visit (from the past 24 hour(s)).    Subjective:     Review of Systems    12 point review  "of systems conducted, negative except as stated in the history of present illness. See HPI for details.      Objective:     Visit Vitals  /82   Pulse 82   Ht 5' 2" (1.575 m)   Wt 88.2 kg (194 lb 6.4 oz)   LMP 07/25/2023 (Exact Date)   BMI 35.56 kg/m²       Physical Exam  Constitutional:  General Appearance : alert, in no acute distress, normal, well nourished.  Neck/Thyroid:  Inspection/Palpation: normal. Thyroid: normal size and shape.  Respiratory:  Respiratory Effort: normal.  Gastrointestinal:  Abdomen: no masses. no tender, nondistended.  Liver and spleen: normal  Hernias: no hernias present, no inguinal adenopathy.  Genitourinary:  External Genitalia: normal, no lesions.  Vagina: normal appearance, no abnormal discharge, no lesions.  Bladder: no mass, nontender.  Urethra: no erythema or lesions present.  Cervix: no lesions, non tender.   Uterus: nontender, normal contour, normal mobility, normal size.   Adnexa: no masses, no tenderness.  Anus and Perineum: visually normal.   Chaperone Present  Assessment:       ICD-10-CM ICD-9-CM   1. Possible exposure to STD  Z20.2 V01.6     Plan   Possible exposure to STD  -     MDL Sendout Test        Follow up in about 10 months (around 6/14/2024) for ANNUAL. In addition to their scheduled follow up, the patient has also been instructed to follow up on as needed basis.     DREW Julio    "

## 2023-10-23 ENCOUNTER — OFFICE VISIT (OUTPATIENT)
Dept: OBSTETRICS AND GYNECOLOGY | Facility: CLINIC | Age: 28
End: 2023-10-23
Payer: MEDICAID

## 2023-10-23 VITALS
DIASTOLIC BLOOD PRESSURE: 88 MMHG | HEART RATE: 75 BPM | BODY MASS INDEX: 36.92 KG/M2 | SYSTOLIC BLOOD PRESSURE: 130 MMHG | HEIGHT: 62 IN | WEIGHT: 200.63 LBS

## 2023-10-23 DIAGNOSIS — Z11.3 SCREENING EXAMINATION FOR SEXUALLY TRANSMITTED DISEASE: Primary | ICD-10-CM

## 2023-10-23 PROCEDURE — 99213 PR OFFICE/OUTPT VISIT, EST, LEVL III, 20-29 MIN: ICD-10-PCS | Mod: ,,, | Performed by: NURSE PRACTITIONER

## 2023-10-23 PROCEDURE — 1159F PR MEDICATION LIST DOCUMENTED IN MEDICAL RECORD: ICD-10-PCS | Mod: CPTII,,, | Performed by: NURSE PRACTITIONER

## 2023-10-23 PROCEDURE — 3075F SYST BP GE 130 - 139MM HG: CPT | Mod: CPTII,,, | Performed by: NURSE PRACTITIONER

## 2023-10-23 PROCEDURE — 3008F PR BODY MASS INDEX (BMI) DOCUMENTED: ICD-10-PCS | Mod: CPTII,,, | Performed by: NURSE PRACTITIONER

## 2023-10-23 PROCEDURE — 3008F BODY MASS INDEX DOCD: CPT | Mod: CPTII,,, | Performed by: NURSE PRACTITIONER

## 2023-10-23 PROCEDURE — 3075F PR MOST RECENT SYSTOLIC BLOOD PRESS GE 130-139MM HG: ICD-10-PCS | Mod: CPTII,,, | Performed by: NURSE PRACTITIONER

## 2023-10-23 PROCEDURE — 3079F DIAST BP 80-89 MM HG: CPT | Mod: CPTII,,, | Performed by: NURSE PRACTITIONER

## 2023-10-23 PROCEDURE — 1159F MED LIST DOCD IN RCRD: CPT | Mod: CPTII,,, | Performed by: NURSE PRACTITIONER

## 2023-10-23 PROCEDURE — 1160F PR REVIEW ALL MEDS BY PRESCRIBER/CLIN PHARMACIST DOCUMENTED: ICD-10-PCS | Mod: CPTII,,, | Performed by: NURSE PRACTITIONER

## 2023-10-23 PROCEDURE — 99213 OFFICE O/P EST LOW 20 MIN: CPT | Mod: ,,, | Performed by: NURSE PRACTITIONER

## 2023-10-23 PROCEDURE — 3079F PR MOST RECENT DIASTOLIC BLOOD PRESSURE 80-89 MM HG: ICD-10-PCS | Mod: CPTII,,, | Performed by: NURSE PRACTITIONER

## 2023-10-23 PROCEDURE — 1160F RVW MEDS BY RX/DR IN RCRD: CPT | Mod: CPTII,,, | Performed by: NURSE PRACTITIONER

## 2023-10-23 NOTE — PROGRESS NOTES
Patient ID: [id number]   Chief Complaint: STD CHECK (PRESENTS TO CLINIC REQUESTING STD TESTING.)    HPI:     Ashley Etienne is a 28 y.o.  here today for STD CHECK (PRESENTS TO CLINIC REQUESTING STD TESTING.)   Patient's last menstrual period was 10/14/2023 (exact date).    Past Medical History:  has a past medical history of Abnormal Pap smear of cervix, ADD (attention deficit disorder), Anxiety disorder, unspecified, and Chlamydia contact, treated.    Surgical History:  has a past surgical history that includes LEFT ARM and [place] tooth extraction.    Family History: family history is not on file.    Social History:  reports that she has never smoked. She has never been exposed to tobacco smoke. She has never used smokeless tobacco. She reports current alcohol use. She reports that she does not use drugs.    Current Outpatient Medications   Medication Sig Dispense Refill    buPROPion (WELLBUTRIN XL) 300 MG 24 hr tablet Take 300 mg by mouth.      desvenlafaxine succinate (PRISTIQ) 50 MG Tb24       dextroamphetamine-amphetamine (ADDERALL) 20 mg tablet Take 1 tablet by mouth once daily.       No current facility-administered medications for this visit.       Patient has No Known Allergies.     MENARCHEAL:  Cycle Length: 5 days   Flow: normal  Dysmenorrhea: No  Intermenstrual Bleeding: No  PAP:  Last PAP: 2023    History of Abnormal PAP Smear: YES: LGSIL BX CIN1 HPV     Treated: Colposcopy  HPV Vaccine: YES: AGE 13   INTERCOURSE:  Dyspareunia: No  Postcoital Bleeding: No  History of STI: Yes   If yes, then: CZ/HPV  Current Birth Control Method: none  Sexually Active: yes  BREAST HISTORY:   Last Mammogram:LEFT BREAST U/S  WNL  COLONOSCOPY:  Last Colonoscopy:  N/A           No results found for this or any previous visit (from the past 24 hour(s)).    Subjective:     Review of Systems    12 point review of systems conducted, negative except as stated in the history of present illness. See HPI for  "details.      Objective:     Visit Vitals  /88   Pulse 75   Ht 5' 2" (1.575 m)   Wt 91 kg (200 lb 9.6 oz)   LMP 10/14/2023 (Exact Date)   BMI 36.69 kg/m²     No results found for this or any previous visit (from the past 24 hour(s)).  Physical Exam  Constitutional:  General Appearance : alert, in no acute distress, normal, well nourished.  Neck/Thyroid:  Inspection/Palpation: normal. Thyroid: normal size and shape.  Respiratory:  Respiratory Effort: normal.  Gastrointestinal:  Abdomen: no masses. no tender, nondistended.  Liver and spleen: normal  Hernias: no hernias present, no inguinal adenopathy.  Genitourinary:  External Genitalia: normal, no lesions.  Vagina: normal appearance, no abnormal discharge, no lesions.  Bladder: no mass, nontender.  Urethra: no erythema or lesions present.  Cervix: no lesions, non tender. ONE SWAB COLLECTED  Uterus: nontender, normal contour, normal mobility, normal size.   Adnexa: no masses, no tenderness.  Anus and Perineum: visually normal.   Chaperone Present  Assessment:       ICD-10-CM ICD-9-CM   1. Screening examination for sexually transmitted disease  Z11.3 V74.5     Plan   Screening examination for sexually transmitted disease  -     Cancel: MDL Sendout Test; Future; Expected date: 10/26/2023  -     MDL Sendout Test        Follow up in about 8 months (around 6/14/2024) for ANNUAL. In addition to their scheduled follow up, the patient has also been instructed to follow up on as needed basis.     DREW Julio    "

## 2024-11-25 ENCOUNTER — OFFICE VISIT (OUTPATIENT)
Dept: OBSTETRICS AND GYNECOLOGY | Facility: CLINIC | Age: 29
End: 2024-11-25
Payer: MEDICAID

## 2024-11-25 VITALS
HEIGHT: 62 IN | HEART RATE: 86 BPM | DIASTOLIC BLOOD PRESSURE: 68 MMHG | RESPIRATION RATE: 18 BRPM | WEIGHT: 193.13 LBS | OXYGEN SATURATION: 99 % | BODY MASS INDEX: 35.54 KG/M2 | SYSTOLIC BLOOD PRESSURE: 118 MMHG

## 2024-11-25 DIAGNOSIS — N92.6 IRREGULAR PERIODS/MENSTRUAL CYCLES: Primary | ICD-10-CM

## 2024-11-25 LAB
B-HCG UR QL: NEGATIVE
CTP QC/QA: YES

## 2024-11-25 PROCEDURE — 1160F RVW MEDS BY RX/DR IN RCRD: CPT | Mod: CPTII,,, | Performed by: NURSE PRACTITIONER

## 2024-11-25 PROCEDURE — 99213 OFFICE O/P EST LOW 20 MIN: CPT | Mod: ,,, | Performed by: NURSE PRACTITIONER

## 2024-11-25 PROCEDURE — 3074F SYST BP LT 130 MM HG: CPT | Mod: CPTII,,, | Performed by: NURSE PRACTITIONER

## 2024-11-25 PROCEDURE — 3008F BODY MASS INDEX DOCD: CPT | Mod: CPTII,,, | Performed by: NURSE PRACTITIONER

## 2024-11-25 PROCEDURE — 81025 URINE PREGNANCY TEST: CPT | Mod: ,,, | Performed by: NURSE PRACTITIONER

## 2024-11-25 PROCEDURE — 1159F MED LIST DOCD IN RCRD: CPT | Mod: CPTII,,, | Performed by: NURSE PRACTITIONER

## 2024-11-25 PROCEDURE — 3078F DIAST BP <80 MM HG: CPT | Mod: CPTII,,, | Performed by: NURSE PRACTITIONER

## 2024-11-25 RX ORDER — AMITRIPTYLINE HYDROCHLORIDE 25 MG/1
25 TABLET, FILM COATED ORAL NIGHTLY
COMMUNITY
Start: 2024-11-06

## 2024-11-25 NOTE — PROGRESS NOTES
" Patient ID: [id number]   Chief Complaint: irregular cycles (C/o irregular cycles . States" cycle came 2 weeks early this month and bleeding was heavy" . States" cycle started 10/14/2025 then started cycle again 2024, then started cycle again on 2024. )    HPI:   Ashley Etienne is a 29 y.o.  here today for irregular cycles (C/o irregular cycles . States" cycle came 2 weeks early this month and bleeding was heavy" . States" cycle started 10/14/2025 then started cycle again 2024, then started cycle again on 2024. )   Pt had regular cycle for years, however she had a cycle in middle of her Oct and Nov cycle which was heavy and Nov cycle was very light. Pt has never had this in past and reports she has always been regular. Patient's last menstrual period was 2024.  Past Medical History:  has a past medical history of Abnormal Pap smear of cervix, ADD (attention deficit disorder), Anxiety disorder, unspecified, and Chlamydia contact, treated.  Surgical History:  has a past surgical history that includes LEFT ARM and [place] tooth extraction.  Family History: family history is not on file.  Social History:  reports that she has never smoked. She has never been exposed to tobacco smoke. She has never used smokeless tobacco. She reports current alcohol use. She reports that she does not use drugs.  Current Outpatient Medications   Medication Sig Dispense Refill    amitriptyline (ELAVIL) 25 MG tablet Take 25 mg by mouth every evening.      buPROPion (WELLBUTRIN XL) 300 MG 24 hr tablet Take 300 mg by mouth.       No current facility-administered medications for this visit.     Patient has No Known Allergies.  MENARCHEAL:  Cycle Length: 3 days   Flow: light  Dysmenorrhea: No  If yes: n/a  Intermenstrual Bleeding: Yes  PAP:  Last PAP: 2023    History of Abnormal PAP Smear: YES: LGSIL BX CIN1 HPV  NEG  Treated: Colposcopy  HPV Vaccine: YES     INTERCOURSE:  Dyspareunia: No  Postcoital " "Bleeding: No  History of STI: Yes   If yes, then: CZ  Current Birth Control Method: none  Sexually Active: yes        Recent Results (from the past 24 hours)   POCT Urine Pregnancy    Collection Time: 11/25/24  2:54 PM   Result Value Ref Range    POC Preg Test, Ur Negative Negative     Acceptable Yes        Subjective:   Review of Systems  12 point review of systems conducted, negative except as stated in the history of present illness. See HPI for details.  Objective:     Visit Vitals  /68 (BP Location: Left arm, Patient Position: Sitting)   Pulse 86   Resp 18   Ht 5' 2" (1.575 m)   Wt 87.6 kg (193 lb 1.6 oz)   LMP 11/19/2024   SpO2 99%   BMI 35.32 kg/m²     Recent Results (from the past 24 hours)   POCT Urine Pregnancy    Collection Time: 11/25/24  2:54 PM   Result Value Ref Range    POC Preg Test, Ur Negative Negative     Acceptable Yes      Physical Exam  Constitutional:  General Appearance : alert, in no acute distress, normal, well nourished.  Neck/Thyroid:  Inspection/Palpation: normal.  Respiratory:  Respiratory Effort: normal.  Gastrointestinal:  Abdomen: no masses. no tender, nondistended.  Liver and spleen: normal  Hernias: no hernias present, no inguinal adenopathy.    Chaperone Present  Assessment:       ICD-10-CM ICD-9-CM   1. Irregular periods/menstrual cycles  N92.6 626.4     Plan   Irregular periods/menstrual cycles  -     POCT Urine Pregnancy  -     MDL Sendout Test    AS this is first time this has happened no labs today however inst pt if continues to happen we will need her to have pelvic us and tsh freet4 and cbc done.     Follow up in about 1 month (around 12/25/2024) for ANNUAL. In addition to their scheduled follow up, the patient has also been instructed to follow up on as needed basis.     DREW Julio    "

## 2025-08-18 ENCOUNTER — OFFICE VISIT (OUTPATIENT)
Dept: OBSTETRICS AND GYNECOLOGY | Facility: CLINIC | Age: 30
End: 2025-08-18
Payer: MEDICAID

## 2025-08-18 VITALS
HEART RATE: 95 BPM | SYSTOLIC BLOOD PRESSURE: 130 MMHG | BODY MASS INDEX: 34.93 KG/M2 | DIASTOLIC BLOOD PRESSURE: 86 MMHG | WEIGHT: 191 LBS

## 2025-08-18 DIAGNOSIS — N75.1 BARTHOLIN'S GLAND ABSCESS: Primary | ICD-10-CM

## 2025-08-18 PROCEDURE — 87070 CULTURE OTHR SPECIMN AEROBIC: CPT | Performed by: OBSTETRICS & GYNECOLOGY

## 2025-08-18 PROCEDURE — 87075 CULTR BACTERIA EXCEPT BLOOD: CPT | Performed by: OBSTETRICS & GYNECOLOGY

## 2025-08-18 RX ORDER — SULFAMETHOXAZOLE AND TRIMETHOPRIM 800; 160 MG/1; MG/1
1 TABLET ORAL 2 TIMES DAILY
Qty: 20 TABLET | Refills: 0 | Status: SHIPPED | OUTPATIENT
Start: 2025-08-18 | End: 2025-08-28

## 2025-08-18 RX ORDER — METRONIDAZOLE 500 MG/1
500 TABLET ORAL EVERY 12 HOURS
Qty: 20 TABLET | Refills: 0 | Status: SHIPPED | OUTPATIENT
Start: 2025-08-18 | End: 2025-08-28

## 2025-08-22 LAB — BACTERIA WND CULT: ABNORMAL

## 2025-08-23 LAB — BACTERIA SPEC ANAEROBE CULT: NORMAL

## 2025-09-02 ENCOUNTER — OFFICE VISIT (OUTPATIENT)
Dept: OBSTETRICS AND GYNECOLOGY | Facility: CLINIC | Age: 30
End: 2025-09-02
Payer: COMMERCIAL

## 2025-09-02 VITALS
DIASTOLIC BLOOD PRESSURE: 80 MMHG | BODY MASS INDEX: 34.24 KG/M2 | SYSTOLIC BLOOD PRESSURE: 124 MMHG | HEART RATE: 74 BPM | WEIGHT: 187.19 LBS

## 2025-09-02 DIAGNOSIS — N75.1 BARTHOLIN'S GLAND ABSCESS: Primary | ICD-10-CM

## 2025-09-02 PROCEDURE — 1160F RVW MEDS BY RX/DR IN RCRD: CPT | Mod: CPTII,,, | Performed by: OBSTETRICS & GYNECOLOGY

## 2025-09-02 PROCEDURE — 3079F DIAST BP 80-89 MM HG: CPT | Mod: CPTII,,, | Performed by: OBSTETRICS & GYNECOLOGY

## 2025-09-02 PROCEDURE — 99213 OFFICE O/P EST LOW 20 MIN: CPT | Mod: ,,, | Performed by: OBSTETRICS & GYNECOLOGY

## 2025-09-02 PROCEDURE — 3074F SYST BP LT 130 MM HG: CPT | Mod: CPTII,,, | Performed by: OBSTETRICS & GYNECOLOGY

## 2025-09-02 PROCEDURE — 3008F BODY MASS INDEX DOCD: CPT | Mod: CPTII,,, | Performed by: OBSTETRICS & GYNECOLOGY

## 2025-09-02 PROCEDURE — 1159F MED LIST DOCD IN RCRD: CPT | Mod: CPTII,,, | Performed by: OBSTETRICS & GYNECOLOGY

## 2025-09-02 RX ORDER — AMPICILLIN 500 MG/1
500 CAPSULE ORAL 3 TIMES DAILY
COMMUNITY